# Patient Record
Sex: FEMALE | Race: WHITE
[De-identification: names, ages, dates, MRNs, and addresses within clinical notes are randomized per-mention and may not be internally consistent; named-entity substitution may affect disease eponyms.]

---

## 2017-03-13 ENCOUNTER — OTHER (OUTPATIENT)
Age: 57
End: 2017-03-13

## 2017-03-13 ENCOUNTER — APPOINTMENT (OUTPATIENT)
Dept: HEMATOLOGY ONCOLOGY | Facility: CLINIC | Age: 57
End: 2017-03-13

## 2017-03-13 VITALS
WEIGHT: 206 LBS | BODY MASS INDEX: 32.33 KG/M2 | HEIGHT: 67 IN | DIASTOLIC BLOOD PRESSURE: 78 MMHG | TEMPERATURE: 97.8 F | SYSTOLIC BLOOD PRESSURE: 139 MMHG | HEART RATE: 63 BPM | RESPIRATION RATE: 15 BRPM

## 2017-04-24 ENCOUNTER — RX RENEWAL (OUTPATIENT)
Age: 57
End: 2017-04-24

## 2017-05-15 ENCOUNTER — APPOINTMENT (OUTPATIENT)
Dept: PLASTIC SURGERY | Facility: AMBULATORY SURGERY CENTER | Age: 57
End: 2017-05-15

## 2017-05-16 ENCOUNTER — RECORD ABSTRACTING (OUTPATIENT)
Age: 57
End: 2017-05-16

## 2017-05-16 DIAGNOSIS — Z87.39 PERSONAL HISTORY OF OTHER DISEASES OF THE MUSCULOSKELETAL SYSTEM AND CONNECTIVE TISSUE: ICD-10-CM

## 2017-10-18 ENCOUNTER — RX RENEWAL (OUTPATIENT)
Age: 57
End: 2017-10-18

## 2017-10-31 ENCOUNTER — APPOINTMENT (OUTPATIENT)
Dept: PLASTIC SURGERY | Facility: CLINIC | Age: 57
End: 2017-10-31
Payer: MEDICAID

## 2017-10-31 PROCEDURE — 99212 OFFICE O/P EST SF 10 MIN: CPT

## 2017-11-06 ENCOUNTER — APPOINTMENT (OUTPATIENT)
Dept: HEMATOLOGY ONCOLOGY | Facility: CLINIC | Age: 57
End: 2017-11-06

## 2017-11-06 ENCOUNTER — OUTPATIENT (OUTPATIENT)
Dept: OUTPATIENT SERVICES | Facility: HOSPITAL | Age: 57
LOS: 1 days | Discharge: HOME | End: 2017-11-06

## 2017-11-06 VITALS
SYSTOLIC BLOOD PRESSURE: 137 MMHG | RESPIRATION RATE: 14 BRPM | TEMPERATURE: 98 F | HEIGHT: 67 IN | HEART RATE: 59 BPM | WEIGHT: 201 LBS | BODY MASS INDEX: 31.55 KG/M2 | DIASTOLIC BLOOD PRESSURE: 101 MMHG

## 2017-11-06 DIAGNOSIS — C50.411 MALIGNANT NEOPLASM OF UPPER-OUTER QUADRANT OF RIGHT FEMALE BREAST: ICD-10-CM

## 2017-11-06 DIAGNOSIS — D05.02 LOBULAR CARCINOMA IN SITU OF LEFT BREAST: ICD-10-CM

## 2017-12-12 ENCOUNTER — OUTPATIENT (OUTPATIENT)
Dept: OUTPATIENT SERVICES | Facility: HOSPITAL | Age: 57
LOS: 1 days | Discharge: HOME | End: 2017-12-12

## 2017-12-12 DIAGNOSIS — C50.919 MALIGNANT NEOPLASM OF UNSPECIFIED SITE OF UNSPECIFIED FEMALE BREAST: ICD-10-CM

## 2017-12-14 DIAGNOSIS — N95.9 UNSPECIFIED MENOPAUSAL AND PERIMENOPAUSAL DISORDER: ICD-10-CM

## 2017-12-14 DIAGNOSIS — Z13.820 ENCOUNTER FOR SCREENING FOR OSTEOPOROSIS: ICD-10-CM

## 2018-01-03 ENCOUNTER — OUTPATIENT (OUTPATIENT)
Dept: OUTPATIENT SERVICES | Facility: HOSPITAL | Age: 58
LOS: 1 days | Discharge: HOME | End: 2018-01-03

## 2018-01-03 DIAGNOSIS — Z01.818 ENCOUNTER FOR OTHER PREPROCEDURAL EXAMINATION: ICD-10-CM

## 2018-01-03 DIAGNOSIS — C50.919 MALIGNANT NEOPLASM OF UNSPECIFIED SITE OF UNSPECIFIED FEMALE BREAST: ICD-10-CM

## 2018-01-05 DIAGNOSIS — Z87.891 PERSONAL HISTORY OF NICOTINE DEPENDENCE: ICD-10-CM

## 2018-01-05 DIAGNOSIS — E78.00 PURE HYPERCHOLESTEROLEMIA, UNSPECIFIED: ICD-10-CM

## 2018-01-05 DIAGNOSIS — C80.0 DISSEMINATED MALIGNANT NEOPLASM, UNSPECIFIED: ICD-10-CM

## 2018-01-05 DIAGNOSIS — I10 ESSENTIAL (PRIMARY) HYPERTENSION: ICD-10-CM

## 2018-01-09 ENCOUNTER — OUTPATIENT (OUTPATIENT)
Dept: OUTPATIENT SERVICES | Facility: HOSPITAL | Age: 58
LOS: 1 days | Discharge: HOME | End: 2018-01-09

## 2018-01-09 DIAGNOSIS — C50.919 MALIGNANT NEOPLASM OF UNSPECIFIED SITE OF UNSPECIFIED FEMALE BREAST: ICD-10-CM

## 2018-01-17 ENCOUNTER — APPOINTMENT (OUTPATIENT)
Dept: PLASTIC SURGERY | Facility: AMBULATORY SURGERY CENTER | Age: 58
End: 2018-01-17
Payer: MEDICAID

## 2018-01-17 ENCOUNTER — INPATIENT (INPATIENT)
Facility: HOSPITAL | Age: 58
LOS: 0 days | Discharge: HOME | End: 2018-01-18
Attending: INTERNAL MEDICINE

## 2018-01-17 DIAGNOSIS — C50.919 MALIGNANT NEOPLASM OF UNSPECIFIED SITE OF UNSPECIFIED FEMALE BREAST: ICD-10-CM

## 2018-01-17 PROCEDURE — 19340 INSJ BREAST IMPLT SM D MAST: CPT | Mod: 50

## 2018-01-17 PROCEDURE — 19328 RMVL INTACT BREAST IMPLANT: CPT | Mod: 50,59

## 2018-01-17 PROCEDURE — 19371 PERI-IMPLT CAPSLC BRST COMPL: CPT | Mod: 50,59

## 2018-01-17 PROCEDURE — 19380 REVJ RECONSTRUCTED BREAST: CPT | Mod: 50,59

## 2018-01-22 DIAGNOSIS — E87.6 HYPOKALEMIA: ICD-10-CM

## 2018-01-22 DIAGNOSIS — I10 ESSENTIAL (PRIMARY) HYPERTENSION: ICD-10-CM

## 2018-01-22 DIAGNOSIS — E78.5 HYPERLIPIDEMIA, UNSPECIFIED: ICD-10-CM

## 2018-01-22 DIAGNOSIS — E83.42 HYPOMAGNESEMIA: ICD-10-CM

## 2018-01-22 DIAGNOSIS — Z90.13 ACQUIRED ABSENCE OF BILATERAL BREASTS AND NIPPLES: ICD-10-CM
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
## 2018-01-22 DIAGNOSIS — I49.3 VENTRICULAR PREMATURE DEPOLARIZATION: ICD-10-CM

## 2018-01-22 DIAGNOSIS — T85.848A PAIN DUE TO OTHER INTERNAL PROSTHETIC DEVICES, IMPLANTS AND GRAFTS, INITIAL ENCOUNTER: ICD-10-CM

## 2018-01-22 DIAGNOSIS — Z87.891 PERSONAL HISTORY OF NICOTINE DEPENDENCE: ICD-10-CM

## 2018-01-22 DIAGNOSIS — Z85.3 PERSONAL HISTORY OF MALIGNANT NEOPLASM OF BREAST: ICD-10-CM

## 2018-01-23 ENCOUNTER — APPOINTMENT (OUTPATIENT)
Dept: PLASTIC SURGERY | Facility: CLINIC | Age: 58
End: 2018-01-23
Payer: MEDICAID

## 2018-01-23 PROCEDURE — 99024 POSTOP FOLLOW-UP VISIT: CPT

## 2018-01-30 ENCOUNTER — APPOINTMENT (OUTPATIENT)
Dept: PLASTIC SURGERY | Facility: CLINIC | Age: 58
End: 2018-01-30
Payer: MEDICAID

## 2018-01-30 PROCEDURE — 99024 POSTOP FOLLOW-UP VISIT: CPT

## 2018-02-20 ENCOUNTER — APPOINTMENT (OUTPATIENT)
Dept: PLASTIC SURGERY | Facility: CLINIC | Age: 58
End: 2018-02-20
Payer: MEDICAID

## 2018-02-20 PROCEDURE — 99024 POSTOP FOLLOW-UP VISIT: CPT

## 2018-03-13 ENCOUNTER — APPOINTMENT (OUTPATIENT)
Dept: PLASTIC SURGERY | Facility: CLINIC | Age: 58
End: 2018-03-13
Payer: MEDICAID

## 2018-03-13 PROCEDURE — 99024 POSTOP FOLLOW-UP VISIT: CPT

## 2018-04-09 ENCOUNTER — RX RENEWAL (OUTPATIENT)
Age: 58
End: 2018-04-09

## 2018-05-08 ENCOUNTER — OUTPATIENT (OUTPATIENT)
Dept: OUTPATIENT SERVICES | Facility: HOSPITAL | Age: 58
LOS: 1 days | Discharge: HOME | End: 2018-05-08

## 2018-05-08 ENCOUNTER — APPOINTMENT (OUTPATIENT)
Dept: HEMATOLOGY ONCOLOGY | Facility: CLINIC | Age: 58
End: 2018-05-08

## 2018-05-08 VITALS
SYSTOLIC BLOOD PRESSURE: 122 MMHG | WEIGHT: 196 LBS | TEMPERATURE: 97.8 F | HEART RATE: 59 BPM | DIASTOLIC BLOOD PRESSURE: 61 MMHG | BODY MASS INDEX: 30.76 KG/M2 | HEIGHT: 67 IN

## 2018-05-09 DIAGNOSIS — D05.02 LOBULAR CARCINOMA IN SITU OF LEFT BREAST: ICD-10-CM

## 2018-05-09 DIAGNOSIS — C50.411 MALIGNANT NEOPLASM OF UPPER-OUTER QUADRANT OF RIGHT FEMALE BREAST: ICD-10-CM

## 2018-05-09 DIAGNOSIS — Z79.811 LONG TERM (CURRENT) USE OF AROMATASE INHIBITORS: ICD-10-CM

## 2018-06-26 ENCOUNTER — APPOINTMENT (OUTPATIENT)
Dept: PLASTIC SURGERY | Facility: CLINIC | Age: 58
End: 2018-06-26
Payer: MEDICAID

## 2018-06-26 PROCEDURE — 99212 OFFICE O/P EST SF 10 MIN: CPT

## 2018-10-15 ENCOUNTER — RX RENEWAL (OUTPATIENT)
Age: 58
End: 2018-10-15

## 2018-11-27 ENCOUNTER — APPOINTMENT (OUTPATIENT)
Dept: HEMATOLOGY ONCOLOGY | Facility: CLINIC | Age: 58
End: 2018-11-27

## 2018-11-27 ENCOUNTER — APPOINTMENT (OUTPATIENT)
Dept: PLASTIC SURGERY | Facility: CLINIC | Age: 58
End: 2018-11-27

## 2019-03-12 ENCOUNTER — APPOINTMENT (OUTPATIENT)
Dept: PLASTIC SURGERY | Facility: CLINIC | Age: 59
End: 2019-03-12
Payer: MEDICAID

## 2019-03-12 VITALS — WEIGHT: 200 LBS | HEIGHT: 67 IN | BODY MASS INDEX: 31.39 KG/M2

## 2019-03-12 PROCEDURE — 99212 OFFICE O/P EST SF 10 MIN: CPT

## 2019-03-12 NOTE — PHYSICAL EXAM
[de-identified] : well-appearing female, NAD [de-identified] : bilateral breast implants soft, indentation with contour deformity to lateral breast L>R with scar contracture and discomfort upon palpation, hypertrophic scar left lateral breast, all incisions healed\par

## 2019-03-12 NOTE — ASSESSMENT
[FreeTextEntry1] : 57 yo F 14 months s/p removal of bilateral implants, capsulectomy and placement of smaller 600 cc silicone implants now with possible early capsular contracture. \par \par - continue implant massage \par - continue Scarguard or silicone patches to left latera breast scar\par \par \par Pt has some discomfort on lateral aspect of each breast packet--from prior capsular modification\par Offered revision with thoaracic flap as option if her symptmos do not improve\par At present patient wishes to observe.\par Will contact PT to see if they would be comfrtable treating scar tissue in this area.\par \par F/u 3 months

## 2019-03-12 NOTE — HISTORY OF PRESENT ILLNESS
[FreeTextEntry1] : 59 yo F who is 14 months s/p removal of bilateral implants, capsulectomy and placement of smaller 600 cc silicone implants. Patient presents today for follow up reporting constant baseline discomfort in bilateral breasts, especially along the lateral aspect associated with burning and tightness. Compliant with daily implant massage.

## 2019-03-18 ENCOUNTER — OUTPATIENT (OUTPATIENT)
Dept: OUTPATIENT SERVICES | Facility: HOSPITAL | Age: 59
LOS: 1 days | Discharge: HOME | End: 2019-03-18

## 2019-03-18 ENCOUNTER — APPOINTMENT (OUTPATIENT)
Dept: HEMATOLOGY ONCOLOGY | Facility: CLINIC | Age: 59
End: 2019-03-18

## 2019-03-18 VITALS
SYSTOLIC BLOOD PRESSURE: 120 MMHG | HEIGHT: 67 IN | BODY MASS INDEX: 31.71 KG/M2 | DIASTOLIC BLOOD PRESSURE: 60 MMHG | WEIGHT: 202 LBS | TEMPERATURE: 96.9 F | RESPIRATION RATE: 14 BRPM | HEART RATE: 65 BPM

## 2019-03-18 RX ORDER — TRAMADOL HYDROCHLORIDE 50 MG/1
50 TABLET, COATED ORAL
Qty: 30 | Refills: 0 | Status: DISCONTINUED | COMMUNITY
Start: 2018-01-17 | End: 2019-03-18

## 2019-03-28 DIAGNOSIS — Z79.811 LONG TERM (CURRENT) USE OF AROMATASE INHIBITORS: ICD-10-CM

## 2019-03-28 DIAGNOSIS — C50.411 MALIGNANT NEOPLASM OF UPPER-OUTER QUADRANT OF RIGHT FEMALE BREAST: ICD-10-CM

## 2019-03-28 DIAGNOSIS — D05.02 LOBULAR CARCINOMA IN SITU OF LEFT BREAST: ICD-10-CM

## 2019-04-01 ENCOUNTER — RX RENEWAL (OUTPATIENT)
Age: 59
End: 2019-04-01

## 2019-04-06 NOTE — PHYSICAL EXAM
[Fully active, able to carry on all pre-disease performance without restriction] : Status 0 - Fully active, able to carry on all pre-disease performance without restriction [Normal] : affect appropriate [de-identified] : B/l breasts s/p mastectomy and implant placement. residual scar at the site of previous skin lesions

## 2019-04-06 NOTE — HISTORY OF PRESENT ILLNESS
[Disease: _____________________] : Disease: [unfilled] [T: ___] : T[unfilled] [N: ___] : N[unfilled] [M: ___] : M[unfilled] [AJCC Stage: ____] : AJCC Stage: [unfilled] [Treatment Protocol] : Treatment Protocol [de-identified] : This is a 58 year old white female who is here for a followup visit.  She has h/o microinvasive breast cancer, stage IA with predominant micropapillary features of the right breast  and lobular carcinoma in situ, pleomorphic variant, of the left breast,status post bilateral mastectomy, and implant reconstruction.  She is currently on adjuvant hormonal therapy with Arimidex since 3/2016.  \par She had a second revision on her breast reconstruction and implant exchange with Dr. Damon on Jan 2018. She complains of tightness in the left lateral breast area.\par \par pt C/o arthralgia, pain in knees and left wrist.\par She had an intraarticular injection in the left wrist.\par She is exercising regularly.\par She is taking Ca + Vit D\par  [de-identified] : IDC [FreeTextEntry1] : Arimidex

## 2019-04-06 NOTE — ASSESSMENT
[FreeTextEntry1] :   The patient has stage IA invasive ductal carcinoma with predominant micropapillary features, estrogen receptor and progesterone receptor positive and HER2 negative of the right breast, and lobular carcinoma in situ, pleomorphic variant, of the left breast, status post bilateral mastectomy and presently on adjuvant hormonal therapy.\par \par \par RECOMMENDATION\par Continue Arimidex 1 mg po daily.  Side effects discussed daya. joint pains, bone loss.  \par Continue calcium and Vitamin D daily.\par exercise regularly\par DEXA scan is due in 12/19.\par RTC in 6M\par pt is due for colonoscopy

## 2019-09-09 ENCOUNTER — RX RENEWAL (OUTPATIENT)
Age: 59
End: 2019-09-09

## 2019-09-30 ENCOUNTER — APPOINTMENT (OUTPATIENT)
Dept: HEMATOLOGY ONCOLOGY | Facility: CLINIC | Age: 59
End: 2019-09-30

## 2019-10-29 ENCOUNTER — APPOINTMENT (OUTPATIENT)
Dept: HEMATOLOGY ONCOLOGY | Facility: CLINIC | Age: 59
End: 2019-10-29
Payer: MEDICAID

## 2019-10-29 ENCOUNTER — OUTPATIENT (OUTPATIENT)
Dept: OUTPATIENT SERVICES | Facility: HOSPITAL | Age: 59
LOS: 1 days | Discharge: HOME | End: 2019-10-29

## 2019-10-29 VITALS
SYSTOLIC BLOOD PRESSURE: 112 MMHG | WEIGHT: 200 LBS | HEART RATE: 60 BPM | TEMPERATURE: 96.6 F | HEIGHT: 67 IN | DIASTOLIC BLOOD PRESSURE: 72 MMHG | RESPIRATION RATE: 14 BRPM | BODY MASS INDEX: 31.39 KG/M2

## 2019-10-29 PROCEDURE — 99214 OFFICE O/P EST MOD 30 MIN: CPT

## 2019-11-03 NOTE — PHYSICAL EXAM
[Fully active, able to carry on all pre-disease performance without restriction] : Status 0 - Fully active, able to carry on all pre-disease performance without restriction [Normal] : affect appropriate [de-identified] : B/l breasts s/p mastectomy and implant placement. residual scar at the site of previous skin lesions

## 2019-11-03 NOTE — ASSESSMENT
[FreeTextEntry1] :   The patient has stage IA invasive ductal carcinoma with predominant micropapillary features, estrogen receptor and progesterone receptor positive and HER2 negative of the right breast, and lobular carcinoma in situ, pleomorphic variant, of the left breast, status post bilateral mastectomy and presently on adjuvant hormonal therapy.\par \par \par RECOMMENDATION\par Continue Arimidex 1 mg po daily.  E-refilled done. \par The side effects from such treatment were discussed in detail including but not limited to hot flashes, weight gain, hair thinning, arthralgia, myalgia, bone loss, increased risk of osteoporotic fractures and thrombo-embolism.\par She will take Ca + VIt D daily while on AI.\par Her compliance and any side effects from such treatment were assessed at today's visit\par \par Continue calcium and Vitamin D daily.\par Exercise regularly\par DEXA scan is due in 12/19. Rx given.\par RTC in 6M\par pt is due for colonoscopy\par \par Case was seen and discussed with Dr. Coombs  who agreed with the assessment and plan.\par

## 2019-11-03 NOTE — HISTORY OF PRESENT ILLNESS
[Disease: _____________________] : Disease: [unfilled] [T: ___] : T[unfilled] [N: ___] : N[unfilled] [M: ___] : M[unfilled] [AJCC Stage: ____] : AJCC Stage: [unfilled] [Treatment Protocol] : Treatment Protocol [de-identified] : This is a 58 year old white female who is here for a followup visit.  She has h/o microinvasive breast cancer, stage IA with predominant micropapillary features of the right breast  and lobular carcinoma in situ, pleomorphic variant, of the left breast,status post bilateral mastectomy, and implant reconstruction.  She is currently on adjuvant hormonal therapy with Arimidex since 3/2016.  \par She had a second revision on her breast reconstruction and implant exchange with Dr. Damon on Jan 2018. She complains of tightness in the left lateral breast area.\par \par pt C/o arthralgia, pain in knees and left wrist.\par She had an intraarticular injection in the left wrist.\par She is exercising regularly.\par She is taking Ca + Vit D\par  [de-identified] : IDC [FreeTextEntry1] : Arimidex [de-identified] : \par 10/29/19\par Patient is here today for follow up visit.  She continues to take Arimidex daily since 3/2016 and tolerates it well.  She feels uncomfortable with her breast implants even though they were reduced by Dr. Damon in 1/2018.  She wants them removed but has not decided when.  Last bone density was done 12/2017 normal.  She takes calcium and Vitamin D daily.  She is aware she needs to have another routine colonoscopy soon and will make an appt with her GI.

## 2019-11-04 DIAGNOSIS — C50.919 MALIGNANT NEOPLASM OF UNSPECIFIED SITE OF UNSPECIFIED FEMALE BREAST: ICD-10-CM

## 2019-11-04 DIAGNOSIS — Z79.811 LONG TERM (CURRENT) USE OF AROMATASE INHIBITORS: ICD-10-CM

## 2020-01-06 ENCOUNTER — FORM ENCOUNTER (OUTPATIENT)
Age: 60
End: 2020-01-06

## 2020-01-07 ENCOUNTER — OUTPATIENT (OUTPATIENT)
Dept: OUTPATIENT SERVICES | Facility: HOSPITAL | Age: 60
LOS: 1 days | Discharge: HOME | End: 2020-01-07

## 2020-01-08 DIAGNOSIS — N95.9 UNSPECIFIED MENOPAUSAL AND PERIMENOPAUSAL DISORDER: ICD-10-CM

## 2020-01-08 DIAGNOSIS — Z13.820 ENCOUNTER FOR SCREENING FOR OSTEOPOROSIS: ICD-10-CM

## 2020-04-27 ENCOUNTER — APPOINTMENT (OUTPATIENT)
Dept: OBGYN | Facility: CLINIC | Age: 60
End: 2020-04-27

## 2020-05-05 ENCOUNTER — APPOINTMENT (OUTPATIENT)
Dept: HEMATOLOGY ONCOLOGY | Facility: CLINIC | Age: 60
End: 2020-05-05
Payer: MEDICAID

## 2020-05-05 PROCEDURE — 99214 OFFICE O/P EST MOD 30 MIN: CPT | Mod: 95

## 2020-05-05 NOTE — PHYSICAL EXAM
[Fully active, able to carry on all pre-disease performance without restriction] : Status 0 - Fully active, able to carry on all pre-disease performance without restriction [Normal] : affect appropriate [de-identified] : No tachypnea [de-identified] : appear nl

## 2020-05-05 NOTE — HISTORY OF PRESENT ILLNESS
[N: ___] : N[unfilled] [T: ___] : T[unfilled] [Disease: _____________________] : Disease: [unfilled] [M: ___] : M[unfilled] [AJCC Stage: ____] : AJCC Stage: [unfilled] [Treatment Protocol] : Treatment Protocol [Home] : at home, [unfilled] , at the time of the visit. [Medical Office: ([facility])___] : at the medical office located in  [Patient] : the patient [Self] : self [de-identified] : This is a 58 year old white female who is here for a followup visit.  She has h/o microinvasive breast cancer, stage IA with predominant micropapillary features of the right breast  and lobular carcinoma in situ, pleomorphic variant, of the left breast,status post bilateral mastectomy, and implant reconstruction.  She is currently on adjuvant hormonal therapy with Arimidex since 3/2016.  \par She had a second revision on her breast reconstruction and implant exchange with Dr. Damon on Jan 2018. She complains of tightness in the left lateral breast area.\par \par pt C/o arthralgia, pain in knees and left wrist.\par She had an intraarticular injection in the left wrist.\par She is exercising regularly.\par She is taking Ca + Vit D\par  [de-identified] : IDC [FreeTextEntry1] : Arimidex [de-identified] : \par 10/29/19\par Patient is here today for follow up visit.  She continues to take Arimidex daily since 3/2016 and tolerates it well.  She feels uncomfortable with her breast implants even though they were reduced by Dr. Damon in 1/2018.  She wants them removed but has not decided when.  Last bone density was done 12/2017 normal.  She takes calcium and Vitamin D daily.  She is aware she needs to have another routine colonoscopy soon and will make an appt with her GI. \par \par 5/5/20\par  Patient being seen via telehealth for  follow up, feeling well.  She denies any new complaints.  Patient denies any new palpable breast lumps, denies skin changes. She continues to have breast discomfort for which takes analgesics as needed.\par She is thinking of getting the implants removed in the future.\par  Patient denies cough, shortness of breath, denies fever, denies bone pain.\par Compliant with AI, Ca + D.\par Her colonoscopy and GYN visit got cancelled due to COVID-19 pandemic\par

## 2020-05-05 NOTE — RESULTS/DATA
[FreeTextEntry1] : EXAM: XR BONE DENSITY AXIAL  PROCEDURE DATE: 01/07/2020  =================================================================   Bone Density Report  =================================================================  Age: 59  Sex: Female  Ethnicity: White  -----------------------------------------------------------------  Indication: postmenopausal; screening for osteoporosis;  Accession number: [id number]  Bone Density:  -----------------------------------------------------------------  Region BMD T-score Z-score Classification  -----------------------------------------------------------------  AP Spine (L1-L4) 1.248 1.8 3.2 Normal  Femoral Neck (Left) 0.919 0.6 1.9 Normal  Total Hip (Left) 0.987 0.4 1.3 Normal  -----------------------------------------------------------------

## 2020-05-05 NOTE — ASSESSMENT
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 \par \par \par RECOMMENDATION\par Continue Arimidex 1 mg po daily.  \par The side effects from such treatment were discussed in detail including but not limited to hot flashes, weight gain, hair thinning, arthralgia, myalgia, bone loss, increased risk of osteoporotic fractures and thrombo-embolism.\par She will take Ca + VIt D daily while on AI.\par Her compliance and any side effects from such treatment were assessed at today's visit\par \par Continue calcium and Vitamin D daily.\par Exercise regularly\par DEXA scan results d/w pt. Next DEXA is due in 1/22\par RTC in 6M\par pt is due for colonoscopy and GYN visit\par \par

## 2020-08-01 ENCOUNTER — EMERGENCY (EMERGENCY)
Facility: HOSPITAL | Age: 60
LOS: 0 days | Discharge: HOME | End: 2020-08-01
Attending: EMERGENCY MEDICINE | Admitting: EMERGENCY MEDICINE
Payer: MEDICAID

## 2020-08-01 VITALS — HEIGHT: 67 IN | WEIGHT: 207.9 LBS

## 2020-08-01 VITALS
RESPIRATION RATE: 16 BRPM | SYSTOLIC BLOOD PRESSURE: 148 MMHG | TEMPERATURE: 97 F | OXYGEN SATURATION: 98 % | HEART RATE: 89 BPM | DIASTOLIC BLOOD PRESSURE: 93 MMHG

## 2020-08-01 DIAGNOSIS — M54.5 LOW BACK PAIN: ICD-10-CM

## 2020-08-01 DIAGNOSIS — Z90.13 ACQUIRED ABSENCE OF BILATERAL BREASTS AND NIPPLES: Chronic | ICD-10-CM

## 2020-08-01 DIAGNOSIS — I10 ESSENTIAL (PRIMARY) HYPERTENSION: ICD-10-CM

## 2020-08-01 DIAGNOSIS — M25.562 PAIN IN LEFT KNEE: ICD-10-CM

## 2020-08-01 DIAGNOSIS — Z98.890 OTHER SPECIFIED POSTPROCEDURAL STATES: ICD-10-CM

## 2020-08-01 DIAGNOSIS — Z88.0 ALLERGY STATUS TO PENICILLIN: ICD-10-CM

## 2020-08-01 PROCEDURE — 73564 X-RAY EXAM KNEE 4 OR MORE: CPT | Mod: 26,LT

## 2020-08-01 PROCEDURE — 99284 EMERGENCY DEPT VISIT MOD MDM: CPT

## 2020-08-01 RX ORDER — KETOROLAC TROMETHAMINE 30 MG/ML
60 SYRINGE (ML) INJECTION ONCE
Refills: 0 | Status: DISCONTINUED | OUTPATIENT
Start: 2020-08-01 | End: 2020-08-01

## 2020-08-01 RX ORDER — IBUPROFEN 200 MG
1 TABLET ORAL
Qty: 12 | Refills: 0
Start: 2020-08-01 | End: 2020-08-04

## 2020-08-01 RX ORDER — METHOCARBAMOL 500 MG/1
1 TABLET, FILM COATED ORAL
Qty: 9 | Refills: 0
Start: 2020-08-01 | End: 2020-08-03

## 2020-08-01 RX ORDER — METHOCARBAMOL 500 MG/1
1000 TABLET, FILM COATED ORAL ONCE
Refills: 0 | Status: COMPLETED | OUTPATIENT
Start: 2020-08-01 | End: 2020-08-01

## 2020-08-01 RX ADMIN — Medication 60 MILLIGRAM(S): at 20:04

## 2020-08-01 RX ADMIN — METHOCARBAMOL 1000 MILLIGRAM(S): 500 TABLET, FILM COATED ORAL at 20:04
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
## 2020-08-01 NOTE — ED ADULT NURSE NOTE - NSIMPLEMENTINTERV_GEN_ALL_ED
Implemented All Universal Safety Interventions:  [place] to call system. Call bell, personal items and telephone within reach. Instruct patient to call for assistance. Room bathroom lighting operational. Non-slip footwear when patient is off stretcher. Physically safe environment: no spills, clutter or unnecessary equipment. Stretcher in lowest position, wheels locked, appropriate side rails in place.

## 2020-08-01 NOTE — ED PROVIDER NOTE - NS ED ROS FT
Constitutional: (-) fever  Eyes/ENT: (-) blurry vision, (-) epistaxis  Cardiovascular: (-) chest pain, (-) syncope  Respiratory: (-) cough, (-) shortness of breath  Gastrointestinal: (-) vomiting, (-) diarrhea  Musculoskeletal: (-) neck pain, (+) back pain, (+) joint pain  Integumentary: (-) rash, (-) edema  Neurological: (-) headache, (-) altered mental status

## 2020-08-01 NOTE — ED PROVIDER NOTE - PHYSICAL EXAMINATION
Physical Exam    Vital Signs: I have reviewed the initial vital signs.  Constitutional: well-nourished, appears stated age, no acute distress  Eyes: Conjunctiva pink, Sclera clear, PERRLA, EOMI.  Cardiovascular: S1 and S2, regular rate, regular rhythm, well-perfused extremities, radial pulses equal and 2+  Respiratory: unlabored respiratory effort, clear to auscultation bilaterally no wheezing, rales and rhonchi  Gastrointestinal: soft, non-tender abdomen, no pulsatile mass, normal bowl sounds  Musculoskeletal: supple neck, no lower extremity edema, no midline tenderness + Left knee non tender to palpation with no swelling noted, LROM secondary to pain. DP pulses equal. no lower back tenderness noted. patient is ambulatory in emergency room with pain.   Integumentary: warm, dry, no rash  Neurologic: awake, alert, cranial nerves II-XII grossly intact, extremities’ motor and sensory functions grossly intact  Psychiatric: appropriate mood, appropriate affect

## 2020-08-01 NOTE — ED PROVIDER NOTE - PATIENT PORTAL LINK FT
You can access the FollowMyHealth Patient Portal offered by [facility] by registering at the following website: [email or web address]. By joining [facility]’s FollowMyHealth portal, you will also be able to view your health information using other applications (apps) compatible with our system.

## 2020-08-01 NOTE — ED PROVIDER NOTE - NSFOLLOWUPCLINICS_GEN_ALL_ED_FT
[facility] ([facility])  Rehabilitation  [address] [place] [address]  Phone: [phone]  Fax:   Follow Up Time: 1-3 Days

## 2020-08-01 NOTE — ED PROVIDER NOTE - CARE PROVIDER_API CALL
Levar Grande  Orthopaedic Surgery  [address] [place] [address]  Phone: [phone]  Fax: [phone]  Follow Up Time: 1-3 Days

## 2020-09-25 PROBLEM — C50.919 MALIGNANT NEOPLASM OF UNSPECIFIED SITE OF UNSPECIFIED FEMALE BREAST: Chronic | Status: ACTIVE | Noted: 2020-08-01

## 2020-09-25 PROBLEM — I10 ESSENTIAL (PRIMARY) HYPERTENSION: Chronic | Status: ACTIVE | Noted: 2020-08-01

## 2020-11-17 ENCOUNTER — APPOINTMENT (OUTPATIENT)
Dept: GASTROENTEROLOGY | Facility: CLINIC | Age: 60
End: 2020-11-17
Payer: MEDICAID

## 2020-11-17 DIAGNOSIS — K21.9 GASTRO-ESOPHAGEAL REFLUX DISEASE W/OUT ESOPHAGITIS: ICD-10-CM

## 2020-11-17 DIAGNOSIS — Z87.891 PERSONAL HISTORY OF NICOTINE DEPENDENCE: ICD-10-CM

## 2020-11-17 DIAGNOSIS — Z80.1 FAMILY HISTORY OF MALIGNANT NEOPLASM OF TRACHEA, BRONCHUS AND LUNG: ICD-10-CM

## 2020-11-17 PROCEDURE — 99204 OFFICE O/P NEW MOD 45 MIN: CPT | Mod: 95

## 2020-11-17 NOTE — PHYSICAL EXAM
[General Appearance - Alert] : alert [Hearing Threshold Finger Rub Not Hart] : hearing was normal [] : no respiratory distress [Oriented To Time, Place, And Person] : oriented to person, place, and time

## 2020-11-17 NOTE — HISTORY OF PRESENT ILLNESS
[Home] : at home, [unfilled] , at the time of the visit. [Medical Office: ([facility])___] : at the medical office located in  [Verbal consent obtained from patient] : the patient, [unfilled] [FreeTextEntry4] : Radha Pollard [de-identified] : 60 year old female patient above average risk for CRC (sister w CRC at 54), breast cancer stage 1, s/p double mastectomy now on hormonal therapy year # 5, personal hx of colon polyps, presents for CRC screening, last colonoscopy more than 7 years ago.\par Also pt reports chronic GERD not responsive to PPI OTC.

## 2020-11-17 NOTE — ASSESSMENT
[FreeTextEntry1] : 60 year old female patient above average risk for CRC (sister w CRC at 54), breast cancer stage 1, s/p double mastectomy now on hormonal therapy year # 5, personal hx of colon polyps, presents for CRC screening, last colonoscopy more than 7 years ago.\par Also pt reports chronic GERD not responsive to PPI OTC. \par \par Needs EGD, colonoscopy (polyp surveillance)\par Risks and benefits discussed with patient.\par

## 2020-11-23 ENCOUNTER — OUTPATIENT (OUTPATIENT)
Dept: OUTPATIENT SERVICES | Facility: HOSPITAL | Age: 60
LOS: 1 days | Discharge: HOME | End: 2020-11-23

## 2020-11-23 ENCOUNTER — APPOINTMENT (OUTPATIENT)
Dept: HEMATOLOGY ONCOLOGY | Facility: CLINIC | Age: 60
End: 2020-11-23
Payer: MEDICAID

## 2020-11-23 VITALS
BODY MASS INDEX: 31.39 KG/M2 | TEMPERATURE: 98.2 F | SYSTOLIC BLOOD PRESSURE: 122 MMHG | HEART RATE: 62 BPM | HEIGHT: 67 IN | DIASTOLIC BLOOD PRESSURE: 75 MMHG | WEIGHT: 200 LBS

## 2020-11-23 DIAGNOSIS — Z90.13 ACQUIRED ABSENCE OF BILATERAL BREASTS AND NIPPLES: Chronic | ICD-10-CM

## 2020-11-23 PROCEDURE — 99214 OFFICE O/P EST MOD 30 MIN: CPT

## 2020-11-23 NOTE — PHYSICAL EXAM
[Fully active, able to carry on all pre-disease performance without restriction] : Status 0 - Fully active, able to carry on all pre-disease performance without restriction [Normal] : normal appearance, no rash, nodules, vesicles, ulcers, erythema [de-identified] : s/p B/L mastectomy with reconstruction

## 2020-11-23 NOTE — ASSESSMENT
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 Plan to continue to complete 5 years of AI\par \par \par RECOMMENDATION\par Continue Arimidex 1 mg po daily.  \par The side effects from such treatment were discussed in detail including but not limited to hot flashes, weight gain, hair thinning, arthralgia, myalgia, bone loss, increased risk of osteoporotic fractures and thrombo-embolism.\par She will take Ca + VIt D daily while on AI.\par Her compliance and any side effects from such treatment were assessed at today's visit\par \par Continue calcium and Vitamin D daily.\par Exercise regularly\par DEXA scan results d/w pt. Next DEXA is due in 1/22\par RTC in 6M\par pt is due for colonoscopy ( 1/13/21) and GYN visit\par \par

## 2020-11-23 NOTE — HISTORY OF PRESENT ILLNESS
[Disease: _____________________] : Disease: [unfilled] [T: ___] : T[unfilled] [N: ___] : N[unfilled] [M: ___] : M[unfilled] [AJCC Stage: ____] : AJCC Stage: [unfilled] [Treatment Protocol] : Treatment Protocol [de-identified] : This is a 58 year old white female who is here for a followup visit.  She has h/o microinvasive breast cancer, stage IA with predominant micropapillary features of the right breast  and lobular carcinoma in situ, pleomorphic variant, of the left breast,status post bilateral mastectomy, and implant reconstruction.  She is currently on adjuvant hormonal therapy with Arimidex since 3/2016.  \par She had a second revision on her breast reconstruction and implant exchange with Dr. Damon on Jan 2018. She complains of tightness in the left lateral breast area.\par \par pt C/o arthralgia, pain in knees and left wrist.\par She had an intraarticular injection in the left wrist.\par She is exercising regularly.\par She is taking Ca + Vit D\par  [de-identified] : IDC [FreeTextEntry1] : Arimidex [de-identified] : \par 10/29/19\par Patient is here today for follow up visit.  She continues to take Arimidex daily since 3/2016 and tolerates it well.  She feels uncomfortable with her breast implants even though they were reduced by Dr. Damon in 1/2018.  She wants them removed but has not decided when.  Last bone density was done 12/2017 normal.  She takes calcium and Vitamin D daily.  She is aware she needs to have another routine colonoscopy soon and will make an appt with her GI. \par \par 5/5/20\par  Patient being seen via telehealth for  follow up, feeling well.  She denies any new complaints.  Patient denies any new palpable breast lumps, denies skin changes. She continues to have breast discomfort for which takes analgesics as needed.\par She is thinking of getting the implants removed in the future.\par  Patient denies cough, shortness of breath, denies fever, denies bone pain.\par Compliant with AI, Ca + D.\par Her colonoscopy and GYN visit got cancelled due to COVID-19 pandemic\par \par 11/23/20\par Pt is here for follow up.\par Had C/O neck pain adiating down her left arm a few months ago. Saw Neuro and underwent an MRI of c spine. Does not know the results. Had postponed follow up with Neuro.\par Compliant with Arimidex, Ca + D

## 2020-12-03 DIAGNOSIS — Z79.811 LONG TERM (CURRENT) USE OF AROMATASE INHIBITORS: ICD-10-CM

## 2020-12-03 DIAGNOSIS — C50.919 MALIGNANT NEOPLASM OF UNSPECIFIED SITE OF UNSPECIFIED FEMALE BREAST: ICD-10-CM

## 2021-01-08 ENCOUNTER — LABORATORY RESULT (OUTPATIENT)
Age: 61
End: 2021-01-08

## 2021-01-08 ENCOUNTER — OUTPATIENT (OUTPATIENT)
Dept: OUTPATIENT SERVICES | Facility: HOSPITAL | Age: 61
LOS: 1 days | Discharge: HOME | End: 2021-01-08

## 2021-01-08 DIAGNOSIS — Z11.59 ENCOUNTER FOR SCREENING FOR OTHER VIRAL DISEASES: ICD-10-CM

## 2021-01-08 DIAGNOSIS — Z90.13 ACQUIRED ABSENCE OF BILATERAL BREASTS AND NIPPLES: Chronic | ICD-10-CM

## 2021-01-11 ENCOUNTER — OUTPATIENT (OUTPATIENT)
Dept: OUTPATIENT SERVICES | Facility: HOSPITAL | Age: 61
LOS: 1 days | Discharge: HOME | End: 2021-01-11
Payer: MEDICAID

## 2021-01-11 ENCOUNTER — TRANSCRIPTION ENCOUNTER (OUTPATIENT)
Age: 61
End: 2021-01-11

## 2021-01-11 ENCOUNTER — RESULT REVIEW (OUTPATIENT)
Age: 61
End: 2021-01-11

## 2021-01-11 VITALS — HEART RATE: 68 BPM | DIASTOLIC BLOOD PRESSURE: 73 MMHG | SYSTOLIC BLOOD PRESSURE: 123 MMHG | RESPIRATION RATE: 15 BRPM

## 2021-01-11 VITALS — HEIGHT: 67 IN | WEIGHT: 205.03 LBS

## 2021-01-11 DIAGNOSIS — Z90.13 ACQUIRED ABSENCE OF BILATERAL BREASTS AND NIPPLES: Chronic | ICD-10-CM

## 2021-01-11 PROCEDURE — 45385 COLONOSCOPY W/LESION REMOVAL: CPT

## 2021-01-11 PROCEDURE — 45380 COLONOSCOPY AND BIOPSY: CPT | Mod: XU

## 2021-01-11 PROCEDURE — 88312 SPECIAL STAINS GROUP 1: CPT | Mod: 26

## 2021-01-11 PROCEDURE — 88305 TISSUE EXAM BY PATHOLOGIST: CPT | Mod: 26

## 2021-01-11 PROCEDURE — 43239 EGD BIOPSY SINGLE/MULTIPLE: CPT | Mod: XS

## 2021-01-11 RX ORDER — ATENOLOL 25 MG/1
1 TABLET ORAL
Qty: 0 | Refills: 0 | DISCHARGE

## 2021-01-11 RX ORDER — SODIUM CHLORIDE 9 MG/ML
1000 INJECTION, SOLUTION INTRAVENOUS
Refills: 0 | Status: DISCONTINUED | OUTPATIENT
Start: 2021-01-11 | End: 2021-01-25

## 2021-01-11 NOTE — ASU DISCHARGE PLAN (ADULT/PEDIATRIC) - CARE PROVIDER_API CALL
Nely Rodriges (MD)  Gastroenterology  [address] [place] [address]  Phone: [phone]  Fax: [phone]  Follow Up Time:

## 2021-01-11 NOTE — CHART NOTE - NSCHARTNOTEFT_GEN_A_CORE
PACU ANESTHESIA ADMISSION NOTE      Procedure: EGD, Colonoscopy  Post op diagnosis:  Screening, Reflux    ____  Intubated  TV:______       Rate: ______      FiO2: ______    __x__  Patent Airway    __x__  Full return of protective reflexes    __x__  Full recovery from anesthesia / back to baseline status      Mental Status:  __x__ Awake   ___x__ Alert   _____ Drowsy   _____ Sedated    Nausea/Vomiting:  __x__ NO  ______Yes,   See Post - Op Orders          Pain Scale (0-10):  _____    Treatment: ____ None    __x__ See Post - Op/PCA Orders    Post - Operative Fluids:   ____ Oral   __x__ See Post - Op Orders    Plan: Discharge:   ____Home       _____Floor     _____Critical Care    _____  Other:_________________    Comments: Patient had smooth intraoperative event, no anesthesia complication.  PACU Vital signs: HR:      65      BP:   116     /    72      RR:  18           O2 Sat: 99       %     Temp  36

## 2021-01-11 NOTE — H&P PST ADULT - HISTORY OF PRESENT ILLNESS
59 yo female patient above average risk CRC sister with CRC at 54, breast cancer stage 1 s/p double mastectomy now on hormonal therapy personal history of colon polyps and chronic GERD not responsive to PPI OTC

## 2021-01-13 LAB — SURGICAL PATHOLOGY STUDY: SIGNIFICANT CHANGE UP

## 2021-01-15 LAB — SURGICAL PATHOLOGY STUDY: SIGNIFICANT CHANGE UP

## 2021-01-20 DIAGNOSIS — K20.90 ESOPHAGITIS, UNSPECIFIED WITHOUT BLEEDING: ICD-10-CM

## 2021-01-20 DIAGNOSIS — K64.4 RESIDUAL HEMORRHOIDAL SKIN TAGS: ICD-10-CM

## 2021-01-20 DIAGNOSIS — D12.3 BENIGN NEOPLASM OF TRANSVERSE COLON: ICD-10-CM

## 2021-01-20 DIAGNOSIS — K44.9 DIAPHRAGMATIC HERNIA WITHOUT OBSTRUCTION OR GANGRENE: ICD-10-CM

## 2021-01-20 DIAGNOSIS — K29.50 UNSPECIFIED CHRONIC GASTRITIS WITHOUT BLEEDING: ICD-10-CM

## 2021-01-20 DIAGNOSIS — Z85.3 PERSONAL HISTORY OF MALIGNANT NEOPLASM OF BREAST: ICD-10-CM

## 2021-01-20 DIAGNOSIS — K63.89 OTHER SPECIFIED DISEASES OF INTESTINE: ICD-10-CM

## 2021-01-20 DIAGNOSIS — I10 ESSENTIAL (PRIMARY) HYPERTENSION: ICD-10-CM

## 2021-01-20 DIAGNOSIS — Z88.0 ALLERGY STATUS TO PENICILLIN: ICD-10-CM

## 2021-01-20 DIAGNOSIS — Z12.11 ENCOUNTER FOR SCREENING FOR MALIGNANT NEOPLASM OF COLON: ICD-10-CM

## 2021-01-20 DIAGNOSIS — K31.7 POLYP OF STOMACH AND DUODENUM: ICD-10-CM

## 2021-01-20 DIAGNOSIS — K63.5 POLYP OF COLON: ICD-10-CM

## 2021-03-23 ENCOUNTER — OUTPATIENT (OUTPATIENT)
Dept: OUTPATIENT SERVICES | Facility: HOSPITAL | Age: 61
LOS: 1 days | Discharge: HOME | End: 2021-03-23

## 2021-03-23 ENCOUNTER — APPOINTMENT (OUTPATIENT)
Dept: HEMATOLOGY ONCOLOGY | Facility: CLINIC | Age: 61
End: 2021-03-23
Payer: MEDICAID

## 2021-03-23 VITALS
WEIGHT: 210 LBS | RESPIRATION RATE: 16 BRPM | DIASTOLIC BLOOD PRESSURE: 76 MMHG | HEIGHT: 67 IN | TEMPERATURE: 97.9 F | HEART RATE: 64 BPM | SYSTOLIC BLOOD PRESSURE: 120 MMHG | BODY MASS INDEX: 32.96 KG/M2

## 2021-03-23 DIAGNOSIS — Z90.13 ACQUIRED ABSENCE OF BILATERAL BREASTS AND NIPPLES: Chronic | ICD-10-CM

## 2021-03-23 PROCEDURE — 99214 OFFICE O/P EST MOD 30 MIN: CPT

## 2021-03-23 NOTE — PHYSICAL EXAM
[Fully active, able to carry on all pre-disease performance without restriction] : Status 0 - Fully active, able to carry on all pre-disease performance without restriction [Normal] : affect appropriate [de-identified] : s/p B/L mastectomy with reconstruction

## 2021-03-23 NOTE — HISTORY OF PRESENT ILLNESS
[Disease: _____________________] : Disease: [unfilled] [T: ___] : T[unfilled] [N: ___] : N[unfilled] [M: ___] : M[unfilled] [AJCC Stage: ____] : AJCC Stage: [unfilled] [Treatment Protocol] : Treatment Protocol [de-identified] : This is a 58 year old white female who is here for a followup visit.  She has h/o microinvasive breast cancer, stage IA with predominant micropapillary features of the right breast  and lobular carcinoma in situ, pleomorphic variant, of the left breast,status post bilateral mastectomy, and implant reconstruction.  She is currently on adjuvant hormonal therapy with Arimidex since 3/2016.  \par She had a second revision on her breast reconstruction and implant exchange with Dr. Damon on Jan 2018. She complains of tightness in the left lateral breast area.\par \par pt C/o arthralgia, pain in knees and left wrist.\par She had an intraarticular injection in the left wrist.\par She is exercising regularly.\par She is taking Ca + Vit D\par  [de-identified] : IDC [FreeTextEntry1] : Arimidex [de-identified] : \par 10/29/19\par Patient is here today for follow up visit.  She continues to take Arimidex daily since 3/2016 and tolerates it well.  She feels uncomfortable with her breast implants even though they were reduced by Dr. Damon in 1/2018.  She wants them removed but has not decided when.  Last bone density was done 12/2017 normal.  She takes calcium and Vitamin D daily.  She is aware she needs to have another routine colonoscopy soon and will make an appt with her GI. \par \par 5/5/20\par  Patient being seen via telehealth for  follow up, feeling well.  She denies any new complaints.  Patient denies any new palpable breast lumps, denies skin changes. She continues to have breast discomfort for which takes analgesics as needed.\par She is thinking of getting the implants removed in the future.\par  Patient denies cough, shortness of breath, denies fever, denies bone pain.\par Compliant with AI, Ca + D.\par Her colonoscopy and GYN visit got cancelled due to COVID-19 pandemic\par \par 11/23/20\par Pt is here for follow up.\par Had C/O neck pain adiating down her left arm a few months ago. Saw Neuro and underwent an MRI of c spine. Does not know the results. Had postponed follow up with Neuro.\par Compliant with Arimidex, Ca + D\par \par 3/23/21\par pt is here for follow up.\par is compliant with AI, Ca + D.\par C/O pain in the reconstructed breast and wishes to get the implants removed.\par

## 2021-03-23 NOTE — ASSESSMENT
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 Pt to continue to complete 5 years of AI\par \par \par RECOMMENDATION\par Continue Arimidex 1 mg po daily till end of this month. She wi;; complete 5 yrs of AI this month.  \par The side effects from such treatment were discussed in detail including but not limited to hot flashes, weight gain, hair thinning, arthralgia, myalgia, bone loss, increased risk of osteoporotic fractures and thrombo-embolism.\par She will take Ca + VIt D daily while on AI.\par Her compliance and any side effects from such treatment were assessed at today's visit\par \par She will speak to her plastic surgeon regarding removal of implants\par Continue calcium and Vitamin D daily.\par Exercise regularly\par DEXA scan results d/w pt. Next DEXA is due in 1/22, pt will follow with PCP regarding that since we will be seeing her once a year\par S/S of recurrence discussed with pt\par RTC in 6M\par pt had colonoscopy ( 1/13/21) and  will schedule a GYN visit\par \par

## 2021-03-26 DIAGNOSIS — Z79.811 LONG TERM (CURRENT) USE OF AROMATASE INHIBITORS: ICD-10-CM

## 2021-03-26 DIAGNOSIS — C50.919 MALIGNANT NEOPLASM OF UNSPECIFIED SITE OF UNSPECIFIED FEMALE BREAST: ICD-10-CM

## 2022-01-13 RX ORDER — POLYETHYLENE GLYCOL 3350 AND ELECTROLYTES WITH LEMON FLAVOR 236; 22.74; 6.74; 5.86; 2.97 G/4L; G/4L; G/4L; G/4L; G/4L
236 POWDER, FOR SOLUTION ORAL
Qty: 1 | Refills: 0 | Status: DISCONTINUED | COMMUNITY
Start: 2020-11-17 | End: 2022-01-13

## 2022-01-18 ENCOUNTER — APPOINTMENT (OUTPATIENT)
Dept: GASTROENTEROLOGY | Facility: CLINIC | Age: 62
End: 2022-01-18

## 2022-02-09 ENCOUNTER — APPOINTMENT (OUTPATIENT)
Dept: NEUROSURGERY | Facility: CLINIC | Age: 62
End: 2022-02-09
Payer: MEDICAID

## 2022-02-09 VITALS — HEIGHT: 67 IN | WEIGHT: 210 LBS | BODY MASS INDEX: 32.96 KG/M2

## 2022-02-09 DIAGNOSIS — M54.9 DORSALGIA, UNSPECIFIED: ICD-10-CM

## 2022-02-09 PROCEDURE — 99203 OFFICE O/P NEW LOW 30 MIN: CPT

## 2022-02-10 NOTE — PLAN
[FreeTextEntry1] : Discuss MRI findings of degenerative changes seen in the lumbar region, I am recommending patient maximizes conservative management, hence, I am referring her to physical therapy and pain management for injections. Given lumbar exercise sheet to do at home. \par May follow up as needed,

## 2022-02-10 NOTE — HISTORY OF PRESENT ILLNESS
[FreeTextEntry1] : back pain  [de-identified] : This is a 61 yrs old female who presents today reporting of 4 weeks of mid low back pain radiating to the right buttock and up to the waist. Denies radicular leg pain, numbness, tingling, and weakness. Denies recent falls or trauma. Describes the pain as "stabbing". She attended physical therapy in September 2021 but it was more for the neck than back, but when the therapist work on the back it alleviate her pain slightly. She has not been evaluated by pain management. Symptom is worse when sitting, and alleivated when walking. \par \par MRI of the lumbar spine w/o contrast done on 2/2/22 at [facility] showed degenerative changes, but no stenosis or disc herniation.

## 2022-03-29 ENCOUNTER — RESULT REVIEW (OUTPATIENT)
Age: 62
End: 2022-03-29

## 2022-03-29 ENCOUNTER — APPOINTMENT (OUTPATIENT)
Dept: HEMATOLOGY ONCOLOGY | Facility: CLINIC | Age: 62
End: 2022-03-29
Payer: MEDICAID

## 2022-03-29 ENCOUNTER — OUTPATIENT (OUTPATIENT)
Dept: OUTPATIENT SERVICES | Facility: HOSPITAL | Age: 62
LOS: 1 days | Discharge: HOME | End: 2022-03-29

## 2022-03-29 VITALS
TEMPERATURE: 97 F | DIASTOLIC BLOOD PRESSURE: 77 MMHG | HEART RATE: 65 BPM | BODY MASS INDEX: 32.65 KG/M2 | WEIGHT: 208 LBS | SYSTOLIC BLOOD PRESSURE: 118 MMHG | HEIGHT: 67 IN

## 2022-03-29 DIAGNOSIS — C50.919 MALIGNANT NEOPLASM OF UNSPECIFIED SITE OF UNSPECIFIED FEMALE BREAST: ICD-10-CM

## 2022-03-29 DIAGNOSIS — Z90.13 ACQUIRED ABSENCE OF BILATERAL BREASTS AND NIPPLES: Chronic | ICD-10-CM

## 2022-03-29 DIAGNOSIS — Z79.811 LONG TERM (CURRENT) USE OF AROMATASE INHIBITORS: ICD-10-CM

## 2022-03-29 PROCEDURE — 99213 OFFICE O/P EST LOW 20 MIN: CPT

## 2022-03-29 NOTE — ASSESSMENT
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 Pt to continue to complete 5 years of AI\par \par \par RECOMMENDATION\par Completed  Arimidex 1 mg PO daily x 5 years on 3/2021.\par \par She will speak to her plastic surgeon regarding removal of implants.  She was not able to have it done last year due to back pain- degenerative changes.  Nerve block done.\par Continue calcium and Vitamin D daily.\par Exercise regularly\par DEXA scan results d/w pt. Next DEXA is due in 1/22.  Rx given and  pt will follow with PCP regarding that since we will be seeing her once a year\par S/S of recurrence discussed with pt\par Pt had colonoscopy ( 1/13/21) and  will schedule a GYN visit.\par \par RTC in one year.\par \par Case was seen and discussed with Dr. Coombs who agreed with the assessment and plan.\par \par \par

## 2022-03-29 NOTE — HISTORY OF PRESENT ILLNESS
[Disease: _____________________] : Disease: [unfilled] [T: ___] : T[unfilled] [N: ___] : N[unfilled] [M: ___] : M[unfilled] [AJCC Stage: ____] : AJCC Stage: [unfilled] [Treatment Protocol] : Treatment Protocol [de-identified] : This is a 58 year old white female who is here for a followup visit.  She has h/o microinvasive breast cancer, stage IA with predominant micropapillary features of the right breast  and lobular carcinoma in situ, pleomorphic variant, of the left breast,status post bilateral mastectomy, and implant reconstruction.  She is currently on adjuvant hormonal therapy with Arimidex since 3/2016.  \par She had a second revision on her breast reconstruction and implant exchange with Dr. Damon on Jan 2018. She complains of tightness in the left lateral breast area.\par \par pt C/o arthralgia, pain in knees and left wrist.\par She had an intraarticular injection in the left wrist.\par She is exercising regularly.\par She is taking Ca + Vit D\par  [de-identified] : IDC [FreeTextEntry1] : Arimidex [de-identified] : \par 10/29/19\par Patient is here today for follow up visit.  She continues to take Arimidex daily since 3/2016 and tolerates it well.  She feels uncomfortable with her breast implants even though they were reduced by Dr. Damon in 1/2018.  She wants them removed but has not decided when.  Last bone density was done 12/2017 normal.  She takes calcium and Vitamin D daily.  She is aware she needs to have another routine colonoscopy soon and will make an appt with her GI. \par \par 5/5/20\par  Patient being seen via telehealth for  follow up, feeling well.  She denies any new complaints.  Patient denies any new palpable breast lumps, denies skin changes. She continues to have breast discomfort for which takes analgesics as needed.\par She is thinking of getting the implants removed in the future.\par  Patient denies cough, shortness of breath, denies fever, denies bone pain.\par Compliant with AI, Ca + D.\par Her colonoscopy and GYN visit got cancelled due to COVID-19 pandemic\par \par 11/23/20\par Pt is here for follow up.\par Had C/O neck pain adiating down her left arm a few months ago. Saw Neuro and underwent an MRI of c spine. Does not know the results. Had postponed follow up with Neuro.\par Compliant with Arimidex, Ca + D\par \par 3/23/21\par pt is here for follow up.\par is compliant with AI, Ca + D.\par C/O pain in the reconstructed breast and wishes to get the implants removed.\par \par 3/29/22\par Pt is here for follow up for breast cancer. She completed 5 years of Arimidex 3/2021.\par She is compliant with Ca + D. Last bone density was done 1/2020 normal.\par She did not have her implants removed yet due to back pain -degenerative changes with nerve block by neurosurgeon.\par Also c/o left knee pain x few months. No Trauma.\par C/O pain in the reconstructed breast and wishes to get the implants removed.\par

## 2022-03-29 NOTE — PHYSICAL EXAM
[Fully active, able to carry on all pre-disease performance without restriction] : Status 0 - Fully active, able to carry on all pre-disease performance without restriction [Normal] : affect appropriate [de-identified] : s/p B/L mastectomy with reconstruction

## 2022-03-30 DIAGNOSIS — Z79.811 LONG TERM (CURRENT) USE OF AROMATASE INHIBITORS: ICD-10-CM

## 2022-03-30 DIAGNOSIS — C50.919 MALIGNANT NEOPLASM OF UNSPECIFIED SITE OF UNSPECIFIED FEMALE BREAST: ICD-10-CM

## 2022-04-12 ENCOUNTER — OUTPATIENT (OUTPATIENT)
Dept: OUTPATIENT SERVICES | Facility: HOSPITAL | Age: 62
LOS: 1 days | Discharge: HOME | End: 2022-04-12

## 2022-04-12 DIAGNOSIS — Z90.13 ACQUIRED ABSENCE OF BILATERAL BREASTS AND NIPPLES: Chronic | ICD-10-CM

## 2022-04-13 DIAGNOSIS — Z13.820 ENCOUNTER FOR SCREENING FOR OSTEOPOROSIS: ICD-10-CM

## 2022-04-13 DIAGNOSIS — N95.9 UNSPECIFIED MENOPAUSAL AND PERIMENOPAUSAL DISORDER: ICD-10-CM

## 2022-07-20 ENCOUNTER — APPOINTMENT (OUTPATIENT)
Dept: NEUROLOGY | Facility: CLINIC | Age: 62
End: 2022-07-20

## 2022-08-23 ENCOUNTER — APPOINTMENT (OUTPATIENT)
Dept: SURGERY | Facility: CLINIC | Age: 62
End: 2022-08-23

## 2022-08-23 VITALS
HEART RATE: 91 BPM | HEIGHT: 67 IN | WEIGHT: 201 LBS | DIASTOLIC BLOOD PRESSURE: 85 MMHG | BODY MASS INDEX: 31.55 KG/M2 | SYSTOLIC BLOOD PRESSURE: 128 MMHG | OXYGEN SATURATION: 100 %

## 2022-08-23 DIAGNOSIS — M79.621 PAIN IN RIGHT UPPER ARM: ICD-10-CM

## 2022-08-23 DIAGNOSIS — Z90.10 ACQUIRED ABSENCE OF UNSPECIFIED BREAST AND NIPPLE: ICD-10-CM

## 2022-08-23 PROCEDURE — 99202 OFFICE O/P NEW SF 15 MIN: CPT

## 2022-08-23 RX ORDER — ANASTROZOLE TABLETS 1 MG/1
1 TABLET ORAL
Qty: 90 | Refills: 1 | Status: COMPLETED | COMMUNITY
Start: 2017-04-24 | End: 2022-08-23

## 2022-08-23 NOTE — PHYSICAL EXAM
[Alert] : alert [Oriented to Person] : oriented to person [Oriented to Place] : oriented to place [Oriented to Time] : oriented to time [Calm] : calm [de-identified] : Patient is in no acute distress. [de-identified] : Patient had bilateral mastectomy.  Though no evidence of any recurrence of mass in the scar area or in the axillary region. [de-identified] : There is no palpable mass in the right upper arm region.  There are some scar tissue prominence in the right lateral aspect of the mastectomy scar or may be a revision scar for the implant.

## 2022-08-23 NOTE — CONSULT LETTER
[Dear  ___] : Dear  [unfilled], [Consult Letter:] : I had the pleasure of evaluating your patient, [unfilled]. [Please see my note below.] : Please see my note below. [Consult Closing:] : Thank you very much for allowing me to participate in the care of this patient.  If you have any questions, please do not hesitate to contact me. [Sincerely,] : Sincerely, [FreeTextEntry3] : Dann Crawford MD, FACS\par [facility]\[place] [address]\par

## 2022-08-23 NOTE — HISTORY OF PRESENT ILLNESS
[de-identified] : Patient presents to the office with a history of pain in her right upper arm and the right sided chest wall.  She had bilateral mastectomy in 2016 and revision of the implant in 2018 but she is still feeling pain and discomfort even without movement of the arm.

## 2022-08-23 NOTE — ASSESSMENT
[FreeTextEntry1] : After examination patient was explained about possibility of scar tissue causing her discomfort.  She was advised to get a consultation from plastic surgeon.  Patient was also explained that there is no palpable mass or palpable lymphadenopathy.  Follow-up in the office as needed.

## 2022-11-26 ENCOUNTER — EMERGENCY (EMERGENCY)
Facility: HOSPITAL | Age: 62
LOS: 0 days | Discharge: HOME | End: 2022-11-26
Attending: EMERGENCY MEDICINE | Admitting: EMERGENCY MEDICINE

## 2022-11-26 VITALS
TEMPERATURE: 98 F | WEIGHT: 197.98 LBS | DIASTOLIC BLOOD PRESSURE: 99 MMHG | HEART RATE: 80 BPM | RESPIRATION RATE: 18 BRPM | OXYGEN SATURATION: 99 % | SYSTOLIC BLOOD PRESSURE: 165 MMHG

## 2022-11-26 DIAGNOSIS — Z85.3 PERSONAL HISTORY OF MALIGNANT NEOPLASM OF BREAST: ICD-10-CM

## 2022-11-26 DIAGNOSIS — I10 ESSENTIAL (PRIMARY) HYPERTENSION: ICD-10-CM

## 2022-11-26 DIAGNOSIS — Z90.13 ACQUIRED ABSENCE OF BILATERAL BREASTS AND NIPPLES: Chronic | ICD-10-CM

## 2022-11-26 DIAGNOSIS — Z90.13 ACQUIRED ABSENCE OF BILATERAL BREASTS AND NIPPLES: ICD-10-CM

## 2022-11-26 DIAGNOSIS — Z88.0 ALLERGY STATUS TO PENICILLIN: ICD-10-CM

## 2022-11-26 DIAGNOSIS — H01.002 UNSPECIFIED BLEPHARITIS RIGHT LOWER EYELID: ICD-10-CM

## 2022-11-26 PROCEDURE — 99283 EMERGENCY DEPT VISIT LOW MDM: CPT

## 2022-11-26 NOTE — ED PROVIDER NOTE - MDM ORDERS SUBMITTED SELECTION
[Type II Diabetes] : Type II Diabetes [Yes ___ How many times per day?] : Yes, [unfilled] times per day [Is Patient aware of signs and symptoms of hypoglycemia and hyperglycemia?] : patient is aware of signs and symptoms of hypoglycemia and hyperglycemia [Does Patient follow with other health care specialists for comprehensive diabetes care?] : Does Patient follow with other health care specialists for comprehensive diabetes care? Yes [Action] : Stage of change: action [Does patient monitor blood pressure at home?] : patient monitors blood pressure at home [Low sodium diet] : low sodium diet [Patient will maintain blood pressure < 130/80 mmHg] : patient will maintain blood pressure < 130/80 mmHg [None] : none [CABG] : CABG [Diabetes Mellitus] : diabetes mellitus [Facility-based] : Facility-based [___ Days per week] : [unfilled] days per week [>= 31 minutes per session] : >= 31 minutes per session [Patient will include healthy emotional coping practices in everyday life, such as: ____] : Patient will include healthy emotional coping practices in everyday life, such as [unfilled] [Meditation] : meditation [Hypertension] : Hypertension [Diabetes] : Diabetes [Is patient on medication for hyperlipidemia?] : Is patient on medication for hyperlipidemia? Yes [Atorvastatin] : atorvastatin [Is Patient adherent with medication?] : patient is adherent with medication [Self] : self [Significant other] : significant other [Self-reports of improved health eating patterns] : Self-reports of improved health eating patterns [MyPlate.gov] : MyPlate.gov [Discuss an overview of healthy eating plan] : Discuss an overview of healthy eating plan [Home monitoring of blood sugar] : home monitoring of blood sugar [Following diabetes way of eating] : following diabetes way of eating [Individualized exercise program as developed at cardiac rehabilitation] : individualized exercise program as developed at cardiac rehabilitation [Patient will verbalize factors contributing to improved blood pressure management] : Patient will verbalize factors contributing to improved blood pressure management [Medication Adherence] : medication adherence [Home monitoring of blood pressure] : home monitoring of blood pressure [Diet changes including (healthy heart eating, less processed foods, low sodium diet)] : Diet changes including (healthy heart eating, less processed foods, low sodium diet) [Halt the salt] : halt the salt [Signs and symptoms] : signs and symptoms [Medications] : medications [HLD] : HLD [Treadmill] : treadmill [Recumbent bike] : recumbent bike [BioStep] : BioStep [Upper body ergometer] : upper body ergometer [Walking] : walking [Perform aerobic activity for ___ consecutive minutes] : perform aerobic activity for [unfilled] consecutive minutes [Signs/Symptoms to report] : signs/symptoms to report [Welcome packet provided] : welcome packet provided [Sedentary] : sedentary [Has the patient given CR team permission to speak with the emergency  about the patient?] : Has the patient given CR team permission to speak with the emergency  about the patient? Yes [Mindfulness] : mindfulness [Preparation/Planning] : Stage of change: preparation/planning [Sugar] : sugar [Sodium] : sodium [Patient will include healthy diet pattern approaches to healthy eating] : Patient will include healthy diet pattern approaches to healthy eating [FreeTextEntry2] : 113 [FreeTextEntry3] : 110-130 [FreeTextEntry4] : Carvedilol and Losartan/HCTZ [Angina with exercise] : no angina with exercise [Fall Risk] : no fall risk [] : no [FreeTextEntry1] : Izabel Stein [FreeTextEntry5] : Eric Edmond [PHQ-9: ___] : PHQ-9: [unfilled] [[place] [place] Score Total: ___] : [place] [place] score total: [unfilled] [Feelings Score: ___] : Feelings Score: [unfilled] Medications [Physical Fitness Score: ____] : Physical Fitness Score: [unfilled] [Daily Activities Score: ___] : Daily Activities Score: [unfilled] [Social Activities Score: ___] : Social Activities Score: [unfilled] [Pain Score: ___] : Pain Score: [unfilled] [Overall Health Score: ___] : Overall Health Score: [unfilled] [Change in Health Score: ___] : Change in Health Score: [unfilled] [Quality of Life Score: ___] : Quality of Life Score: [unfilled] [Social Support Score: ___] : Social Support Score: [unfilled] [FreeTextEntry6] : Pt. denies barriers to her emotional well-being. States she worries at times but, she is doing much better.  She is a devoted [employer], states her [place] family and intermediate family are very supportive.  [Rate your plate score: ____] : Rate your plate score: [unfilled] [Patient has seen registered dietitian in the past?] : Patient has seen registered dietitian in the past? No [Patient is interested in seeing a registered dietitian?] : Patient is interested in seeing a registered dietitian? No [FreeTextEntry8] : Goals:\par Pt. will increase lean meat intake to 2-3 times per week\par Pt. will increase whole grain intake to 2-3 times per week\par Pt. will decrease rice/past intake to 0-1 time per week [FreeTextEntry7] : Pt. reports  decrease appetite and 19 lbs unintentional weight loss since surgery. States she recently included Glucerna shakes 1-2 times daily.  She noticed her appetite is slowly returning.

## 2022-11-26 NOTE — ED PROVIDER NOTE - CLINICAL SUMMARY MEDICAL DECISION MAKING FREE TEXT BOX
Infection of the right lower eye lid. Will need antibiotic, warm compress. Follow up ophthalmology this week.

## 2022-11-26 NOTE — ED PROVIDER NOTE - NSFOLLOWUPINSTRUCTIONS_ED_ALL_ED_FT
warm compresses to right eye several times a day, Take antibiotics as prescribed, See your ophthalmologist this week for follow up, Return to ED for increased redness, pain, vision changes

## 2022-11-26 NOTE — ED PROVIDER NOTE - OBJECTIVE STATEMENT
Patient c/o right lower eye lid infection 2 days, Started with small pimple, inside lower lid which in now draining, Low lid now more swollen, no eye pain, no vision changes

## 2022-11-26 NOTE — ED PROVIDER NOTE - ATTENDING APP SHARED VISIT CONTRIBUTION OF CARE
Swelling to the lower eye led x three days and had pimple. This broke and pus expressed. Mild swelling to the lower right eye lid. No pain on EOM. conjunctiva lower eye lid there is a papule.

## 2022-11-26 NOTE — ED PROVIDER NOTE - ATTENDING SHARED VISIT SELECTORS
Spoke with [facility] and they said that the patient was scheduled for set up on 01/31/17, but the patient had to reschedule for 02/20/17.  
History/Exam/Medical Decision Making

## 2022-11-26 NOTE — ED PROVIDER NOTE - PATIENT PORTAL LINK FT
You can access the FollowMyHealth Patient Portal offered by [facility] by registering at the following website: [email or web address]. By joining [employer]’s FollowMyHealth portal, you will also be able to view your health information using other applications (apps) compatible with our system.

## 2022-11-26 NOTE — ED PROVIDER NOTE - NS ED ATTENDING STATEMENT MOD
This was a shared visit with the DA. I reviewed and verified the documentation and independently performed the documented:

## 2022-11-28 ENCOUNTER — APPOINTMENT (OUTPATIENT)
Dept: PLASTIC SURGERY | Facility: CLINIC | Age: 62
End: 2022-11-28

## 2022-11-28 VITALS — BODY MASS INDEX: 31.08 KG/M2 | HEIGHT: 67 IN | WEIGHT: 198 LBS

## 2022-11-28 DIAGNOSIS — Z98.890 OTHER SPECIFIED POSTPROCEDURAL STATES: ICD-10-CM

## 2022-11-28 PROCEDURE — 99204 OFFICE O/P NEW MOD 45 MIN: CPT

## 2022-11-29 NOTE — ASSESSMENT
[FreeTextEntry1] : 63 yo F with PMHx of RIGHt breast cancer s/p BL SSM and immediate submuscular TE to staged silicone implant breast reconstruction.  most recent revision of reconstruction s/p removal of bilateral implants, capsulectomy and placement of smaller 600 cc silicone implants ([place])\par \par Recommend BL Breast recon revision with delayed bilateral GEORGE flap, possible implant based reconstruction (TE or silicone implant, possible abdominal wall repair with mesh, and indicated procedures\par \par - recommend Singulair for interim mgmt of CC and possible relief of pain\par - B/R/A reviewed.  Pt agreed to Singulair trial\par \par -The patient was counseled about reconstructive options following mastectomy, including autologous, prosthetic, and the options of foregoing reconstruction and removing implants with resultant absence of breast and possible contour asymmetry of the the chest.  \par \par - The patient was extensively counseled on the operation and the perioperative recoveries of both autologous and prosthetic reconstructions. The patient understands the likely position of scars and the use of surgical drains. The patient understands the risks with abdominally based microsurgical reconstruction including partial or total flap loss, revisit to the operating room in the sorin-operative period, wound dehiscence, mastectomy skin flap necrosis, fat necrosis, abdominal wall morbidity (laxity, bulge, hernia), asymmetry, paresthesia, seroma, hematoma, and infection. She also understands the risks with implant-based reconstruction include but not limited to hematoma, seroma, infection, implant malposition, extrusion, deflation, capsular contracture, mastectomy skin flap necrosis, wound dehiscence, asymmetry, paresthesia, small risk of breast-implant associated lymphoma,  likelihood of requiring additional procedures related to the implant over the course of her lifetime, possible need for additional surgery including revision and/or autologous tissue reconstruction (e.g. pedicled latissimus dorsi flap, TDAP flap, etc).  She was also informed on the off-label use of biologic mesh, its benefits, risks and alternatives. She was given the opportunity to ask questions; and all were answered to her satisfaction at this time.\par \par -Reviewed option of repeat BL breast implant capsulectomy, implant exchange and alloderm insertion.  Patient declined this treatment option.\par \par - Pt would like to consider GEORGE flap reconstruction.\par - Recommend pre-op CTA chest and abd/pelvis\par - all questions were answered\par - follow up after CTA chest/abd/pelvis\par \par Due to COVID-19, pre-visit patient instructions were explained to the patient and their symptoms were checked upon arrival. Masks were used by the healthcare provider and staff and the examination room was cleaned after the patient visit concluded\par

## 2022-11-29 NOTE — HISTORY OF PRESENT ILLNESS
[FreeTextEntry1] : 61 yo with PMHx of HTN, HLD, and RIGHT breast microinvasive ductal carcinoma stage IA , s/p  BL SSM (Dr. Crawford) and immediate breast reconstruction with s/p BL total submuscular/Alloderm TE breast recon (2016, Dr. Damon). Then followed by s/p BL TE to silicone implant exchange (750 ml), capsulectomy and revision of reconstruction (7/2016).  She did not require chemotherapy.  She was only on adjuvant hormone therapy - Arimidex.\par \par Developed left capsular contracture and breast asymmetry with  s/p revision BL breast reconstruction, left capsulectomy, left breast silicone implant exchange (1/2017).  \par \par Pt with history of BL lateral breast pain and developed minor capsular contracture and pt requested implant downsize.  s/p revision BL breast reconstruction with BL capsulectomy and implant exchange (to 600 ml) (1/2018)\par \par New consultation - Pt last seen 3/2019 by Dr. Damon\par She is here for a second opinion.  She has complaint of BL breast lateral pain all the time, also c/o BL breast pain and tightness.  Not relieved by advil or ibuprofen.  She has been compliant with implant massage daily with no relief.\par \par Here to discuss mgmt of breast pain and revision of reconstruction.  Pt reports that she never wanted implant reconstruction and she would like to discuss GEORGE flap breast reconstruction.\par \par Pre-mastectomy D cup\par Current bra size:? bra prosthetic to help with projection\par SHx: lap OMAYRA\par No ASA\par No VTE or MRSA skin infection\par

## 2022-11-29 NOTE — PHYSICAL EXAM
[de-identified] : well-appearing female, NAD [de-identified] : EOMI [de-identified] : nonlabored breathing [de-identified] : Bilateral breast reconstruction with submuscular implants with Grade 4 capsular contracture, lateral contour deformity with depression anterior lateral chest; left breast implant medialized position with symmastic appearance of breast cleft; IMF similar position; well healed mastectomy incision; no palpable masses BL [de-identified] : soft, NT, ND, no palpable hernias, minor infraumbilical pannus grade 1 with sufficient soft tissue donor site for BL breast reconstruction

## 2023-03-28 ENCOUNTER — APPOINTMENT (OUTPATIENT)
Dept: HEMATOLOGY ONCOLOGY | Facility: CLINIC | Age: 63
End: 2023-03-28

## 2023-09-22 RX ORDER — MONTELUKAST 10 MG/1
10 TABLET, FILM COATED ORAL
Qty: 30 | Refills: 0 | Status: DISCONTINUED | COMMUNITY
Start: 2022-11-28 | End: 2023-09-22

## 2023-09-22 RX ORDER — UBIDECARENONE/VIT E ACET 100MG-5
CAPSULE ORAL
Refills: 0 | Status: DISCONTINUED | COMMUNITY
End: 2023-09-22

## 2023-09-26 ENCOUNTER — APPOINTMENT (OUTPATIENT)
Dept: GASTROENTEROLOGY | Facility: CLINIC | Age: 63
End: 2023-09-26
Payer: MEDICAID

## 2023-09-26 DIAGNOSIS — K63.5 POLYP OF COLON: ICD-10-CM

## 2023-09-26 DIAGNOSIS — Z78.9 OTHER SPECIFIED HEALTH STATUS: ICD-10-CM

## 2023-09-26 DIAGNOSIS — Z12.11 ENCOUNTER FOR SCREENING FOR MALIGNANT NEOPLASM OF COLON: ICD-10-CM

## 2023-09-26 DIAGNOSIS — Z86.010 PERSONAL HISTORY OF COLONIC POLYPS: ICD-10-CM

## 2023-09-26 PROCEDURE — 99214 OFFICE O/P EST MOD 30 MIN: CPT | Mod: 95

## 2023-09-26 RX ORDER — SODIUM PICOSULFATE, MAGNESIUM OXIDE, AND ANHYDROUS CITRIC ACID 10; 3.5; 12 MG/160ML; G/160ML; G/160ML
10-3.5-12 MG-GM LIQUID ORAL
Qty: 2 | Refills: 0 | Status: ACTIVE | COMMUNITY
Start: 2023-09-26 | End: 1900-01-01

## 2023-09-26 RX ORDER — MULTIVITAMIN
TABLET ORAL
Refills: 0 | Status: ACTIVE | COMMUNITY

## 2023-09-26 RX ORDER — ATORVASTATIN CALCIUM 80 MG/1
TABLET, FILM COATED ORAL
Refills: 0 | Status: ACTIVE | COMMUNITY
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
## 2024-03-23 ENCOUNTER — APPOINTMENT (OUTPATIENT)
Dept: ORTHOPEDIC SURGERY | Facility: CLINIC | Age: 64
End: 2024-03-23
Payer: MEDICAID

## 2024-03-23 VITALS — BODY MASS INDEX: 32.02 KG/M2 | WEIGHT: 204 LBS | HEIGHT: 67 IN

## 2024-03-23 DIAGNOSIS — M79.675 PAIN IN LEFT TOE(S): ICD-10-CM

## 2024-03-23 PROCEDURE — 73630 X-RAY EXAM OF FOOT: CPT | Mod: LT

## 2024-03-23 PROCEDURE — 99203 OFFICE O/P NEW LOW 30 MIN: CPT

## 2024-03-23 NOTE — ASSESSMENT
[FreeTextEntry1] : 64-year-old female here for evaluation of pain to her MP joint of the left big toe.  She denies any trauma or falls.  Reports pain and swelling in the absence of any trauma or falls over the last 2 days.  Physical examination of the left big toe: Mild swelling appreciated throughout.  No ecchymosis or erythema appreciated.  Skin is intact.  Tense palpation over the MP joint of the left big toe.  Decreased range of motion secondary to pain and swelling.  Sensorimotor intact distally.  Neuro vas intact.  Antalgic gait.  X-rays left big toe taken in the office today:  No acute fractures, subluxations, or dislocations.  My clinical suspicion is high for a gout attack of the left big toe.  X-rays were discussed and are negative.  Discussed the treatment plan for gout moving forwards.  I recommend the patient see her primary care doctor for prescription for blood work and further evaluation/treatment of a  possible gout attack in her left big toe.  Red flag symptoms discussed.  She will call with any questions or concerns. All questions and concerns addressed to patient's satisfaction. Patient expresses full understanding of treatment plan.

## 2024-04-15 ENCOUNTER — OUTPATIENT (OUTPATIENT)
Dept: OUTPATIENT SERVICES | Facility: HOSPITAL | Age: 64
LOS: 1 days | End: 2024-04-15
Payer: MEDICAID

## 2024-04-15 ENCOUNTER — RESULT REVIEW (OUTPATIENT)
Age: 64
End: 2024-04-15

## 2024-04-15 DIAGNOSIS — Z13.820 ENCOUNTER FOR SCREENING FOR OSTEOPOROSIS: ICD-10-CM

## 2024-04-15 DIAGNOSIS — Z00.8 ENCOUNTER FOR OTHER GENERAL EXAMINATION: ICD-10-CM

## 2024-04-15 DIAGNOSIS — Z90.13 ACQUIRED ABSENCE OF BILATERAL BREASTS AND NIPPLES: Chronic | ICD-10-CM

## 2024-04-15 PROCEDURE — 77080 DXA BONE DENSITY AXIAL: CPT | Mod: 26

## 2024-04-15 PROCEDURE — 77080 DXA BONE DENSITY AXIAL: CPT

## 2024-04-16 DIAGNOSIS — Z13.820 ENCOUNTER FOR SCREENING FOR OSTEOPOROSIS: ICD-10-CM

## 2025-02-14 NOTE — ED PROVIDER NOTE - CARE PLAN
Writer called patient, line rang several times and sounded beep. Line did not provide voicemail prompt. Message was left for patient to return call.     Patient is scheduled to receive Prevnar 20 and RSV vaccine Friday, 2/21/25. Please clarify if patient has called and checked with insurance to see if RSV vaccine is covered through his insurance at the clinic. If not covered, patient could be billed for up to $655 + a $65 administration fee. Some insurance plans will cover the vaccine through a pharmacy but not through the office, so it is best to check prior to receiving. If he would like to receive vaccine here, he will be provided a consent form to sign acknowledging that he has checked to ensure the vaccine is covered and is willing to cover the cost if not covered.    1 Principal Discharge DX:	Blepharitis

## 2025-04-08 ENCOUNTER — EMERGENCY (EMERGENCY)
Facility: HOSPITAL | Age: 65
LOS: 0 days | Discharge: HOME | End: 2025-04-08
Attending: EMERGENCY MEDICINE
Payer: MEDICARE

## 2025-04-08 VITALS
SYSTOLIC BLOOD PRESSURE: 143 MMHG | DIASTOLIC BLOOD PRESSURE: 83 MMHG | HEART RATE: 78 BPM | OXYGEN SATURATION: 99 % | RESPIRATION RATE: 17 BRPM | TEMPERATURE: 98 F | WEIGHT: 205.03 LBS

## 2025-04-08 DIAGNOSIS — S61.012A LACERATION WITHOUT FOREIGN BODY OF LEFT THUMB WITHOUT DAMAGE TO NAIL, INITIAL ENCOUNTER: ICD-10-CM

## 2025-04-08 DIAGNOSIS — Z23 ENCOUNTER FOR IMMUNIZATION: ICD-10-CM
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
## 2025-04-08 DIAGNOSIS — Z88.0 ALLERGY STATUS TO PENICILLIN: ICD-10-CM

## 2025-04-08 DIAGNOSIS — Z90.13 ACQUIRED ABSENCE OF BILATERAL BREASTS AND NIPPLES: Chronic | ICD-10-CM

## 2025-04-08 PROCEDURE — 12001 RPR S/N/AX/GEN/TRNK 2.5CM/<: CPT

## 2025-04-08 PROCEDURE — 90471 IMMUNIZATION ADMIN: CPT

## 2025-04-08 PROCEDURE — 99283 EMERGENCY DEPT VISIT LOW MDM: CPT | Mod: 25

## 2025-04-08 PROCEDURE — 90715 TDAP VACCINE 7 YRS/> IM: CPT

## 2025-04-08 PROCEDURE — 99284 EMERGENCY DEPT VISIT MOD MDM: CPT | Mod: 25

## 2025-04-08 RX ORDER — CLOSTRIDIUM TETANI TOXOID ANTIGEN (FORMALDEHYDE INACTIVATED), CORYNEBACTERIUM DIPHTHERIAE TOXOID ANTIGEN (FORMALDEHYDE INACTIVATED), BORDETELLA PERTUSSIS TOXOID ANTIGEN (GLUTARALDEHYDE INACTIVATED), BORDETELLA PERTUSSIS FILAMENTOUS HEMAGGLUTININ ANTIGEN (FORMALDEHYDE INACTIVATED), BORDETELLA PERTUSSIS PERTACTIN ANTIGEN, AND BORDETELLA PERTUSSIS FIMBRIAE 2/3 ANTIGEN 5; 2; 2.5; 5; 3; 5 [LF]/.5ML; [LF]/.5ML; UG/.5ML; UG/.5ML; UG/.5ML; UG/.5ML
0.5 INJECTION, SUSPENSION INTRAMUSCULAR ONCE
Refills: 0 | Status: COMPLETED | OUTPATIENT
Start: 2025-04-08 | End: 2025-04-08

## 2025-04-08 RX ADMIN — CLOSTRIDIUM TETANI TOXOID ANTIGEN (FORMALDEHYDE INACTIVATED), CORYNEBACTERIUM DIPHTHERIAE TOXOID ANTIGEN (FORMALDEHYDE INACTIVATED), BORDETELLA PERTUSSIS TOXOID ANTIGEN (GLUTARALDEHYDE INACTIVATED), BORDETELLA PERTUSSIS FILAMENTOUS HEMAGGLUTININ ANTIGEN (FORMALDEHYDE INACTIVATED), BORDETELLA PERTUSSIS PERTACTIN ANTIGEN, AND BORDETELLA PERTUSSIS FIMBRIAE 2/3 ANTIGEN 0.5 MILLILITER(S): 5; 2; 2.5; 5; 3; 5 INJECTION, SUSPENSION INTRAMUSCULAR at 14:20

## 2025-04-08 NOTE — ED PROVIDER NOTE - PHYSICAL EXAMINATION
vital signs: I have reviewed the initial vital signs  constitutional: no acute distress, normocephalic  msk: extremity good rom, no bony tenderness, no deformity, good cap refill, no tendon injury , no foreign bodies  skin: +laceration right thumb flap laceration 1.5 cm , without any tendon injury , +bleeding from wound, no swelling or bruising  neuro: no sensory or motor deficits of extremity. no focal deficits, gait steady, AOx3
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
## 2025-04-08 NOTE — ED PROVIDER NOTE - PATIENT PORTAL LINK FT
You can access the FollowMyHealth Patient Portal offered by [facility] by registering at the following website: [email or web address]. By joining [employer]’s FollowMyHealth portal, you will also be able to view your health information using other applications (apps) compatible with our system.

## 2025-04-08 NOTE — ED ADULT NURSE NOTE - NSFALLRISKASMTTYPE_ED_ALL_ED
From: Georgi Ames  To: Ashley Sales  Sent: 7/23/2023 8:48 PM CDT  Subject: Georgi - hives     This message is being sent by Araceli Sauer on behalf of Georgi Ames.    Hello, Sunday morning at 4am while georgi was awake she was complaining of her body itching. So I looked it was small hives small spots, mid morning they got a little bigger gave her a bath, with epsom salt. Gave her benadrly, 7:45pm they came back full swing! Face neck legs chest back, gave her some more benadrly another bath no soap . 8:45om still very hived up and she crying it itches. Anything I can do other than what I have been doing ?   
Initial (On Arrival)

## 2025-04-08 NOTE — ED PROVIDER NOTE - DIFFERENTIAL DIAGNOSIS
Differential Diagnosis The differential diagnosis for patients clinical presentation includes but is not limited to:  laceration, foreign body, sprain, strain, fracture, contusion
